# Patient Record
Sex: FEMALE | Race: WHITE | NOT HISPANIC OR LATINO | Employment: PART TIME | URBAN - METROPOLITAN AREA
[De-identification: names, ages, dates, MRNs, and addresses within clinical notes are randomized per-mention and may not be internally consistent; named-entity substitution may affect disease eponyms.]

---

## 2019-06-17 ENCOUNTER — TRANSCRIBE ORDERS (OUTPATIENT)
Dept: NEUROLOGY | Facility: CLINIC | Age: 39
End: 2019-06-17

## 2019-06-17 DIAGNOSIS — M54.2 CERVICALGIA: Primary | ICD-10-CM

## 2019-06-19 ENCOUNTER — TRANSCRIBE ORDERS (OUTPATIENT)
Dept: NEUROLOGY | Facility: CLINIC | Age: 39
End: 2019-06-19

## 2019-06-19 ENCOUNTER — PROCEDURE VISIT (OUTPATIENT)
Dept: NEUROLOGY | Facility: CLINIC | Age: 39
End: 2019-06-19
Payer: COMMERCIAL

## 2019-06-19 DIAGNOSIS — M54.2 CERVICALGIA: ICD-10-CM

## 2019-06-19 DIAGNOSIS — G56.03 BILATERAL CARPAL TUNNEL SYNDROME: ICD-10-CM

## 2019-06-19 DIAGNOSIS — M54.2 CERVICALGIA: Primary | ICD-10-CM

## 2019-06-19 PROCEDURE — 95886 MUSC TEST DONE W/N TEST COMP: CPT | Performed by: PHYSICAL MEDICINE & REHABILITATION

## 2019-06-19 PROCEDURE — 95911 NRV CNDJ TEST 9-10 STUDIES: CPT | Performed by: PHYSICAL MEDICINE & REHABILITATION

## 2021-02-10 ENCOUNTER — HOSPITAL ENCOUNTER (EMERGENCY)
Facility: HOSPITAL | Age: 41
Discharge: HOME | End: 2021-02-10
Attending: EMERGENCY MEDICINE | Admitting: EMERGENCY MEDICINE
Payer: COMMERCIAL

## 2021-02-10 VITALS
WEIGHT: 238.1 LBS | BODY MASS INDEX: 39.62 KG/M2 | HEART RATE: 102 BPM | RESPIRATION RATE: 16 BRPM | TEMPERATURE: 97.5 F | DIASTOLIC BLOOD PRESSURE: 86 MMHG | SYSTOLIC BLOOD PRESSURE: 189 MMHG | OXYGEN SATURATION: 99 %

## 2021-02-10 DIAGNOSIS — K08.89 PAIN, DENTAL: Primary | ICD-10-CM

## 2021-02-10 PROCEDURE — 99284 EMERGENCY DEPT VISIT MOD MDM: CPT | Performed by: EMERGENCY MEDICINE

## 2021-02-10 PROCEDURE — 99282 EMERGENCY DEPT VISIT SF MDM: CPT

## 2021-02-10 RX ORDER — AMOXICILLIN 250 MG/1
500 CAPSULE ORAL ONCE
Status: COMPLETED | OUTPATIENT
Start: 2021-02-10 | End: 2021-02-10

## 2021-02-10 RX ORDER — AMOXICILLIN 500 MG/1
500 CAPSULE ORAL 3 TIMES DAILY
Qty: 21 CAPSULE | Refills: 0 | Status: SHIPPED | OUTPATIENT
Start: 2021-02-10 | End: 2021-02-17

## 2021-02-10 RX ORDER — HYDROCODONE BITARTRATE AND ACETAMINOPHEN 5; 325 MG/1; MG/1
2 TABLET ORAL ONCE
Status: COMPLETED | OUTPATIENT
Start: 2021-02-10 | End: 2021-02-10

## 2021-02-10 RX ORDER — HYDROCODONE BITARTRATE AND ACETAMINOPHEN 5; 325 MG/1; MG/1
1 TABLET ORAL EVERY 6 HOURS PRN
Qty: 10 TABLET | Refills: 0 | Status: SHIPPED | OUTPATIENT
Start: 2021-02-10 | End: 2021-02-20

## 2021-02-10 RX ADMIN — AMOXICILLIN 500 MG: 250 CAPSULE ORAL at 10:30

## 2021-02-10 RX ADMIN — HYDROCODONE BITARTRATE AND ACETAMINOPHEN 2 TABLET: 5; 325 TABLET ORAL at 10:30

## 2021-02-10 NOTE — ED PROVIDER NOTES
History  Chief Complaint   Patient presents with    Dental Pain     Dental pain, worst in right lower jaw 2/2 to dental caries and possible fracture  Gradually worsening for days/weeks and now having trouble tolerating the pain  Feels like pain is radiating into jaw but no facial or neck swelling  No fevers  No trismus  History provided by:  Patient   used: No    Dental Pain  Location:  Lower  Lower teeth location:  31/RL 2nd molar and 30/RL 1st molar  Quality:  Aching  Severity:  Moderate  Onset quality:  Gradual  Timing:  Constant  Progression:  Worsening  Chronicity:  New  Context: dental fracture and poor dentition    Relieved by:  Nothing  Worsened by:  Touching, pressure, cold food/drink and hot food/drink  Ineffective treatments:  None tried  Associated symptoms: no drooling, no facial swelling, no fever, no gum swelling, no headaches, no neck pain, no neck swelling and no trismus        None       Past Medical History:   Diagnosis Date    Disease of thyroid gland        History reviewed  No pertinent surgical history  History reviewed  No pertinent family history  I have reviewed and agree with the history as documented  E-Cigarette/Vaping     E-Cigarette/Vaping Substances     Social History     Tobacco Use    Smoking status: Never Smoker    Smokeless tobacco: Never Used   Substance Use Topics    Alcohol use: Not Currently    Drug use: Never       Review of Systems   Constitutional: Negative for chills and fever  HENT: Positive for dental problem  Negative for drooling, ear pain, facial swelling, sore throat, trouble swallowing and voice change  Eyes: Negative for pain and visual disturbance  Respiratory: Negative for cough and shortness of breath  Cardiovascular: Negative for chest pain and palpitations  Gastrointestinal: Negative for abdominal pain and vomiting  Genitourinary: Negative for dysuria and hematuria     Musculoskeletal: Negative for arthralgias, back pain and neck pain  Skin: Negative for color change and rash  Neurological: Negative for seizures, syncope and headaches  All other systems reviewed and are negative  Physical Exam  Physical Exam  Vitals signs and nursing note reviewed  Constitutional:       General: She is not in acute distress  Appearance: She is well-developed  HENT:      Head: Normocephalic and atraumatic  Mouth/Throat:      Mouth: Mucous membranes are moist       Pharynx: No oropharyngeal exudate  Comments: There are multiple dental caries, worst in R lower jaw where 2nd molar is either fractured or badly decayed  There is no drainage or fluctuance to suggest drainable abscess  No trismus  No sign of PTA, RPA, ANUG or ludwigs  Eyes:      Conjunctiva/sclera: Conjunctivae normal    Neck:      Musculoskeletal: Neck supple  Cardiovascular:      Rate and Rhythm: Normal rate and regular rhythm  Heart sounds: No murmur  Pulmonary:      Effort: Pulmonary effort is normal  No respiratory distress  Breath sounds: Normal breath sounds  Abdominal:      Palpations: Abdomen is soft  Tenderness: There is no abdominal tenderness  Musculoskeletal: Normal range of motion  General: No swelling or tenderness  Skin:     General: Skin is warm and dry  Capillary Refill: Capillary refill takes less than 2 seconds  Neurological:      General: No focal deficit present  Mental Status: She is alert and oriented to person, place, and time           Vital Signs  ED Triage Vitals   Temperature Pulse Respirations Blood Pressure SpO2   02/10/21 1017 02/10/21 1017 02/10/21 1017 02/10/21 1017 02/10/21 1017   97 5 °F (36 4 °C) 102 16 (!) 189/86 99 %      Temp src Heart Rate Source Patient Position - Orthostatic VS BP Location FiO2 (%)   -- -- -- -- --             Pain Score       02/10/21 1030       9           Vitals:    02/10/21 1017   BP: (!) 189/86   Pulse: 102         Visual Acuity      ED Medications  Medications   HYDROcodone-acetaminophen (NORCO) 5-325 mg per tablet 2 tablet (2 tablets Oral Given 2/10/21 1030)   amoxicillin (AMOXIL) capsule 500 mg (500 mg Oral Given 2/10/21 1030)       Diagnostic Studies  Results Reviewed     None                 No orders to display              Procedures  Procedures         ED Course                                           MDM  Number of Diagnoses or Management Options  Pain, dental:   Diagnosis management comments: Dental pain without high risk features  Will start abx and pain meds, recommend prompt f/u with dentistry  Dental referral and return precautions provided  Disposition  Final diagnoses:   Pain, dental     Time reflects when diagnosis was documented in both MDM as applicable and the Disposition within this note     Time User Action Codes Description Comment    2/10/2021 10:35 AM Sirena Faith Add [K08 89] Pain, dental       ED Disposition     ED Disposition Condition Date/Time Comment    Discharge Stable Wed Feb 10, 2021 10:26 AM Delroy Wilson discharge to home/self care  Follow-up Information     Follow up With Specialties Details Why Contact Info    [facility]    [address] [phone]          Discharge Medication List as of 2/10/2021 10:38 AM      START taking these medications    Details   amoxicillin (AMOXIL) 500 mg capsule Take 1 capsule (500 mg total) by mouth 3 (three) times a day for 21 doses, Starting Wed 2/10/2021, Until Wed 2/17/2021, Print      HYDROcodone-acetaminophen (NORCO) 5-325 mg per tablet Take 1 tablet by mouth every 6 (six) hours as needed for pain (dentalgia) for up to 10 daysMax Daily Amount: 4 tablets, Starting Wed 2/10/2021, Until Sat 2/20/2021, Print           No discharge procedures on file      PDMP Review       Value Time User    PDMP Reviewed  Yes 2/10/2021 10:35 AM Clarence Boggs MD          ED Provider  Electronically Signed by           Miguel Soto MD  02/26/21 9336

## 2022-02-26 ENCOUNTER — APPOINTMENT (EMERGENCY)
Dept: RADIOLOGY | Facility: HOSPITAL | Age: 42
End: 2022-02-26
Payer: COMMERCIAL

## 2022-02-26 ENCOUNTER — APPOINTMENT (EMERGENCY)
Dept: CT IMAGING | Facility: HOSPITAL | Age: 42
End: 2022-02-26
Payer: COMMERCIAL

## 2022-02-26 ENCOUNTER — HOSPITAL ENCOUNTER (EMERGENCY)
Facility: HOSPITAL | Age: 42
Discharge: HOME | End: 2022-02-26
Attending: EMERGENCY MEDICINE
Payer: COMMERCIAL

## 2022-02-26 VITALS
DIASTOLIC BLOOD PRESSURE: 86 MMHG | SYSTOLIC BLOOD PRESSURE: 163 MMHG | WEIGHT: 236.33 LBS | BODY MASS INDEX: 39.38 KG/M2 | HEIGHT: 65 IN | HEART RATE: 97 BPM | OXYGEN SATURATION: 100 % | TEMPERATURE: 97.8 F | RESPIRATION RATE: 18 BRPM

## 2022-02-26 DIAGNOSIS — S16.1XXA CERVICAL STRAIN, ACUTE, INITIAL ENCOUNTER: ICD-10-CM

## 2022-02-26 DIAGNOSIS — S46.811A TRAPEZIUS MUSCLE STRAIN, RIGHT, INITIAL ENCOUNTER: ICD-10-CM
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
## 2022-02-26 LAB
EXT PREG TEST URINE: NEGATIVE
EXT. CONTROL ED NAV: NORMAL

## 2022-02-26 PROCEDURE — 81025 URINE PREGNANCY TEST: CPT | Performed by: EMERGENCY MEDICINE

## 2022-02-26 PROCEDURE — 93005 ELECTROCARDIOGRAM TRACING: CPT

## 2022-02-26 PROCEDURE — 72125 CT NECK SPINE W/O DYE: CPT

## 2022-02-26 PROCEDURE — G1004 CDSM NDSC: HCPCS

## 2022-02-26 PROCEDURE — 99285 EMERGENCY DEPT VISIT HI MDM: CPT | Performed by: EMERGENCY MEDICINE

## 2022-02-26 PROCEDURE — 73030 X-RAY EXAM OF SHOULDER: CPT

## 2022-02-26 PROCEDURE — 99285 EMERGENCY DEPT VISIT HI MDM: CPT

## 2022-02-26 PROCEDURE — 71046 X-RAY EXAM CHEST 2 VIEWS: CPT

## 2022-02-26 PROCEDURE — 72080 X-RAY EXAM THORACOLMB 2/> VW: CPT

## 2022-02-26 RX ORDER — LIDOCAINE 50 MG/G
1 PATCH TOPICAL ONCE
Status: DISCONTINUED | OUTPATIENT
Start: 2022-02-26 | End: 2022-02-26 | Stop reason: HOSPADM

## 2022-02-26 RX ORDER — METHOCARBAMOL 500 MG/1
1000 TABLET, FILM COATED ORAL ONCE
Status: COMPLETED | OUTPATIENT
Start: 2022-02-26 | End: 2022-02-26

## 2022-02-26 RX ORDER — IBUPROFEN 600 MG/1
600 TABLET ORAL ONCE
Status: COMPLETED | OUTPATIENT
Start: 2022-02-26 | End: 2022-02-26

## 2022-02-26 RX ORDER — NAPROXEN 500 MG/1
500 TABLET ORAL EVERY 12 HOURS PRN
Qty: 20 TABLET | Refills: 0 | Status: SHIPPED | OUTPATIENT
Start: 2022-02-26

## 2022-02-26 RX ORDER — ACETAMINOPHEN 325 MG/1
650 TABLET ORAL ONCE
Status: COMPLETED | OUTPATIENT
Start: 2022-02-26 | End: 2022-02-26

## 2022-02-26 RX ORDER — METHOCARBAMOL 500 MG/1
500 TABLET, FILM COATED ORAL EVERY 8 HOURS PRN
Qty: 15 TABLET | Refills: 0 | Status: SHIPPED | OUTPATIENT
Start: 2022-02-26

## 2022-02-26 RX ADMIN — LIDOCAINE 5% 1 PATCH: 700 PATCH TOPICAL at 17:46

## 2022-02-26 RX ADMIN — METHOCARBAMOL 1000 MG: 500 TABLET ORAL at 17:46

## 2022-02-26 RX ADMIN — ACETAMINOPHEN 650 MG: 325 TABLET, FILM COATED ORAL at 17:46

## 2022-02-26 RX ADMIN — IBUPROFEN 600 MG: 600 TABLET ORAL at 17:46
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
## 2022-02-27 LAB
ATRIAL RATE: 99 BPM
P AXIS: 52 DEGREES
PR INTERVAL: 136 MS
QRS AXIS: 28 DEGREES
QRSD INTERVAL: 82 MS
QT INTERVAL: 350 MS
QTC INTERVAL: 449 MS
T WAVE AXIS: 14 DEGREES
VENTRICULAR RATE: 99 BPM

## 2022-02-27 PROCEDURE — 93010 ELECTROCARDIOGRAM REPORT: CPT | Performed by: INTERNAL MEDICINE

## 2022-02-27 NOTE — DISCHARGE INSTRUCTIONS
ÅÌåÇÏ ÇáÝÞÑÇÊ ÇáÚäÞíÉ   JRMYCKGAC TCJNPUO ãÚÑÝÊåÇ:   Åä ÅÌåÇÏ ÇáÝÞÑÇÊ ÇáÚäÞíÉ ÚÈÇÑÉ Úä ÔÏ Ãæ ÊãÒÞ Ýí LNLZEHX Ãæ ÇáÃæÊÇÑ ÇáãæÌæÏÉ ÈÑÞÈÊß  QTYEYEJP åí ÚÈÇÑÉ Úä ÃäÓÌÉ ÞæíÉ ÊÑÈØ AAIMSUB ÈÇáÚÙÇã  ÅÑÔÇÏÇÊ ÇáÚäÇíÉ ÈÚÏ ÇáÎÑæÌ ãä ÇáãÓÊÔÝì:   ÇÍÕá Úáì ÑÚÇíÉ ÝæÑðÇ ÅÐÇ:  · ÇáÔÚæÑ ÈÃáã Ãæ ÎÏÑ Ýí ÇáÌÒÁ ãä ÃÓÝá ÇáßÊÝ ÍÊì íÏß  · ÇáãÚÇäÇÉ ãä ãÔßáÇÊ Ýí ÇáÑÄíÉ Ãæ ÇáÓãÚ Ãæ ÇáÊæÇÒä  · ÇáÔÚæÑ TDNXJPMDG Ãæ ÚÏã ÇáÊÑßíÒ  · CQQPXMYW ãä ãÔßáÇÊ Ýí GDUUWJ æÞæÉ ÇáÌÓã  ÇÊÕá ÈÇáØÈíÈ ÇáãÊÇÈÚ áÍÇáÊß ÅÐÇ ÍÏË ãÇ íáí:  · ÇáãÚÇäÇÉ ãä ÒíÇÏÉ ÇáÊæÑã Ãæ ÇáÔÚæÑ ÈÇáÃáã Ýí ÇáÑÞÈÉ  · ßÇä áÏíß GOCUXCLMS Ãæ ãÎÇæÝ ãÊÚáÞÉ ÈÍÇáÊß Ãæ ÈÇáÑÚÇíÉ ÇáãÞÏãÉ  ÇáÃÏæíÉ: ÞÏ ÊÍÊÇÌ áÃí ããÇ íáí:  · ÇáÃÓíÊÇãíäæÝíä íÎÝÝ ãä ÇáÃáã NBPVQE  ÓæÝ ÊÌÏå ULZXHS Ïæä ÇáÍÇÌÉ Åáì æÕÝÉ ØÈíÉ  ÇÓÊÝÓÑ Úä ßãíÉ ÇáÏæÇÁ ÇáæÇÌÈ YKZRKIZ æÚÏÏ ãÑÇÊ FQEMPGH  ÇÊÈÚ TDERSYRTD  ÇÞÑÃ KZAMDGEO FHJFFPSI ÈßÇÝÉ ÇáÃÏæíÉ ÇáÃÎÑì ÇáÊí BKZQZPUD ááæÞæÝ Úáì ãÇ ÅÐÇ ßÇäÊ ÊÍÊæí ÃíÖðÇ Úáì ÇáÃÓíÊÇãíäæÝíä Ãæ ÇÓÊÝÓÑ ãä ÇáØÈíÈ Ãæ ÇáÕíÏáí  íãßä Ãä íÊÓÈÈ ÇáÃÓíÊÇãíäæÝíä Ýí ÊáÝ ÇáßÈÏ ÅÐÇ áã íÊã ÊäÇæáå ÈØÑíÞÉ ÕÍíÍÉ  áÇ ÊÓÊÎÏã ÃßËÑ ãä 4 ÌÑÇãÇÊ (4,000 ãááí ÌÑÇã) ÈÔßá ÅÌãÇáí ãä ÇáÃÓíÊÇãíäæÝíä Ýí Çáíæã ÇáæÇÍÏ  · ÇáÃÏæíÉ ÇááÇÓÊíÑæíÏíÉ ÇáãÖÇÏÉ ááÇáÊåÇÈ (NSAIDs) ãËá ÅíÈæÈÑæÝíä¡ Úáì ÊÎÝíÝ ÇáÊæÑã XSDUWC æÇáÍãì  íãßä ÇáÍÕæá Úáì åÐÇ ÇáÏæÇÁ ÈäÇÁð Úáì æÕÝÉ ØÈíÉ Ãæ ÏæäåÇ  æíãßä Ãä ÊÊÓÈÈ ÇáÃÏæíÉ ÇááÇÓÊíÑæíÏíÉ ÇáãÖÇÏÉ ááÇáÊåÇÈ (NSAIDs) Ýí ÍÏæË äÒíÝ ÈÇáãÚÏÉ Ãæ ãÔßáÇÊ ÈÇáßáì ÚäÏ ÈÚÖ ÇáÃÔÎÇÕ  ÅÐÇ ßäÊ UPEBVL ÃÏæíÉ áãäÚ ÊÎËÑ ÇáÏã¡ ÝÇÍÑÕ ÏÇÆãðÇ Úáì VRWXROPYX ãä ãÞÏã ÇáÑÚÇíÉ ÇáÕÍíÉ WACHAWO Úä ãÊÇÈÚÊß ÚãÇ ÅÐÇ ßÇäÊ ÇáÃÏæíÉ ÇááÇÓÊíÑæíÏíÉ ÇáãÖÇÏÉ ááÇáÊåÇÈ (NSAID) ÂãäÉ áß Ãã áÇ  ÇÞÑÃ ÏÇÆãðÇ ÇáãáÕÞ ÇáØÈí æÇÊÈÚ ÇáÊÚáíãÇÊ  · ãÑÎíÇÊ RDBZXFT ÊÓÇÚÏ Úáì ÊÞáíá ÇáÃáã æÊÔäÌÇÊ ÇáÚÖáÇÊ  · ËãÉ ÏæÇÁ íÕÝå ÇáØÈíÈ áÊÓßíä ÇáÃáã ÞÏ íÊã ÅÚØÇÄå  ÇÓÊÝÓÑ ãä ãÞÏã ÇáÑÚÇíÉ ÇáÕÍíÉ Úä ßíÝíÉ ÊäÇæá åÐÇ ÇáÏæÇÁ ÈÃãÇä  ÊÍÊæí ÈÚÖ ÇáÃÏæíÉ ÇáãæÕæÝÉ áÊÓßíä ÇáÃáã Úáì ÇáÃÓíÊÇãíäæÝíä   áÇ ÊÞã ÈÊäÇæá ÃÏæíÉ ÃÎÑì ÊÍÊæí Úáì ÇáÃÓíÊÇãíäæÝíä ãä Ïæä ÇáÊÍÏË ãÚ ãÞÏã ÇáÑÚÇíÉ ÇáÕÍíÉ áÏíß  ÊäÇæá ÇáßËíÑ ãä ÇáÃÓíÊÇãíäæÝíä íãßä Ãä íÊÓÈÈ Ýí ÍÏæË ÊáÝ ÈÇáßÈÏ  ÞÏ íÊÓÈÈ ÇáÏæÇÁ ÇáãæÕæÝ áÊÓßíä ÇáÃáã Ýí ÍÏæË ÅãÓÇß  ÇÓÊÝÓÑ ãä ãÞÏã ÇáÑÚÇíÉ ÇáÕÍíÉ áÏíß Úä ßíÝíÉ ÊÌäÈ ÍÏæË ÇáÅãÓÇß Ãæ ÚáÇÌå  · ÊäÇæá ÇáÏæÇÁ æÝÞÇ XVHKNVYZL  ÇÊÕá ÈãÞÏã ÇáÑÚÇíÉ ÇáÕÍíÉ áÏíß ÅÐÇ ßäÊ ÊÚÊÞÏ Ãä ÇáÏæÇÁ ÇáÐí PMWXWKS áÇ íÝíÏß Ãæ ÅÐÇ ßäÊ ÊÚÇäí ãä ÂËÇÑ ÌÇäÈíÉ  ÃÎÈÑå Ãæ ÃÎÈÑåÇ ÅÐÇ ßÇäÊ áÏíß ÍÓÇÓíÉ ãä Ãí ÏæÇÁ  ÇÍÊÝÙ ÈÞÇÆãÉ ÇáÃÏæíÉ æÇáÝíÊÇãíäÇÊ GQFXEXEA ÇáÊí ÊÊäÇæáåÇ  ÃÏÑöÌ ÝíåÇ ÌÑÚÇÊ ÇáÃÏæíÉ æãæÇÚíÏåÇ æÓÈÈ ÊäÇæáåÇ  ÃÍÖöÑ ãÚß åÐå ÇáÞÇÆãÉ Ãæ ÚÈæÇÊ ÇáÃÞÑÇÕ ÚäÏ ÒíÇÑÇÊ ÇáãÊÇÈÚÉ  Asher Hawk ÞÇÆãÉ ÇáÃÏæíÉ ãÚß ÊÍÓÈðÇ áÍÇáÇÊ ÇáØæÇÑÆ  AFKFVKL ãÚ ÇáÃÚÑÇÖ:  · íõÑÌì æÖÚ ÔíÁ ÓÇÎä Úáì ÑÞÈÊß áãÏÉ ÊÊÑÇæÍ Èíä 15 Åáì 20 ÏÞíÞÉ¡ Ãæ ãä 4 Åáì 6 ãÑÇÊ Ãæ æÝÞðÇ ááÅÑÔÇÏÇÊ KJQXTPVODS  ÝÅä ACOWXQX ÊÓÇÚÏ Úáì ÊÞáíá ÇáÃáã Daivd Mylar AZPFNTC æÊÔäÌÇÊ ÇáÚÖáÇÊ  · ÇÈÏÃ Ýí ããÇÑÓÉ ÊãÑíäÇÊ áØíÝÉ ááÑÞÈÉ ÈÃÓÑÚ æÞÊ ããßä ÍíäãÇ ÊÓÊØíÚ ÊÍÑíß ÑÞÈÊß Ïæä ÇáÔÚæÑ ÈÃáã  ÊÓÇÚÏ ÇáÊãÑíäÇÊ ÇáÑíÇÖíÉ Úáì ÇáÊÞáíá ãä ÍÏÉ ÇáÊíÈÓ æÊÒíÏ ãä ÞæÉ ÑÞÈÊß VIAVSEN Úáì ÊÍÑíßåÇ  ÇÓÊÝÓÑ ãä ãÞÏã ÇáÑÚÇíÉ ÇáÕÍíÉ Úä äæÚ ÇáÊãÑíäÇÊ ÇáÑíÇÖíÉ ÇáÊí íÌÈ Úáíß ããÇÑÓÊåÇ  · ÚÏ Åáì ÃäÔØÊß ÇáãÚÊÇÏÉ ÈÔßá ÊÏÑíÌí ÍÓÈ HZSQLSJNQ  ÊæÞÝ ÅÐÇ ÔÚÑÊ ÈÃáã  ÊÌäÈ ÇáÃäÔØÉ ÇáÊí ÞÏ ÊÍÏË ÇáãÒíÏ ãä ÇáÖÑÑ ÈÑÞÈÊß¡ ãËá ÑÝÚ PQEBDUQ Ãæ ÇáÊãÇÑíä ÇáÑíÇÖíÉ ÇáÚäíÝÉ  · áÇ ÊÓÊÎÏã ÇáæÓÇÏÉ ÃËäÇÁ Çáäæã ßí ÊÓÇÚÏ Ýí ÊÎÝíÝ ÇáÃáã  Þã ÈáÝ ãäÔÝÉ ÈÅÍßÇã æÖÚåÇ ÊÍÊ ÑÞÈÊß ÈÏáÇð ãä Ðáß  · ÇáÊÒã ÈÌáÓÇÊ ÇáÚáÇÌ Iline Apgar DFFGRACBT  íãßä Ãä íÚáãß ãÊÎÕÕ ÇáÚáÇÌ ÇáØÈíÚí ÊÏÑíÈÇÊ ááãÓÇÚÏÉ Úáì ÊÍÓíä ÇáÍÑßÉ æÊÞáíá ÇáÃáã  ãäÚ ÍÏæË ÅÕÇÈÉ ÃÎÑì ÈÇáÑÞÈÉ:  · ÇÊÈÚ ØÑíÞÉ ÂãäÉ ááÞíÇÏÉ  ÊÃßÏ ãä ÌãíÚ ÇáÑÇßÈíä ÈÓíÇÑÊß íÓÊÎÏãæä ÍÒÇã ÇáÃãÇä  Ýíãßä áÍÒÇã ÇáÃãÇä Ãä íäÞÐ ÍíÇÊß Ýí ÍÇáÉ ÊÚÑÖß áÍÇÏË  ÊÌäÈ WZOOWWK ÇáåÇÊÝ INZTRUZ ÃËäÇÁ ÇáÞíÇÏÉ  ÝÞÏ íÊÓÈÈ Ðáß Ýí ÊÔÊíÊ ÇäÊÈÇåß æíÚÑÖß áÍÇÏË  ÃæÞÝ ÇáÓíÇÑÉ Úáì ÌÇäÈ ÇáØÑíÞ ÅÐÇ ÇÍÊÌÊ áÅÌÑÇÁ ãßÇáãÉ Ãæ ÅÑÓÇá ÑÓÇáÉ äÕíÉ  · ÇÑÊÏö ÎæÐÉ¡ æÓÊÑÉ ÇáäÌÇÉ ãä ÇáÛÑÞ¡ æÇáãáÇÈÓ ÇáæÇÞíÉ   ÇÍÑÕ ÏÇÆãðÇ Úáì ÇÑÊÏÇÁ ÎæÐÉ ÚäÏãÇ ÊÑßÈ ÏÑÇÌÉ Ãæ ÏÑÇÌÉ äÇÑíÉ¡ Ãæ ããÇÑÓÉ YSIXHT¡ Ãæ ããÇÑÓÉ ÇáÃáÚÇÈ ÇáÑíÇÖíÉ ÇáÊí ÞÏ ÊÓÈÈ ÅÕÇÈÉ ÈÇáÑÃÓ  ÇÑÊÏö ÃÌåÒÉ æÇÞíÉ ÚäÏ ããÇÑÓÉ ÇáÃáÚÇÈ ÇáÑíÇÖíÉ  ÇÑÊÏö ÓÊÑÉ ÇáäÌÇÉ ãä ÇáÛÑÞ ÚäÏ ÑßæÈß ÇáãÑßÈ Ãæ ããÇÑÓÉ ÇáÃáÚÇÈ ÇáãÇÆíÉ  íÌÈ ãÊÇÈÚÉ ÇáÍÇáÉ ãÚ ÇáØÈíÈ KYIRCXJ áß æÝÞÇð ááÅÑÔÇÏÇÊ ÇáãÞÏãÉ: æÞÏ ÊõÍÇá Åáì ØÈíÈ ÚÙÇã Ãæ ãÑÇßÒ ÇáÚáÇÌ ÇáØÈíÚí  íõÝÖá ÊÏæíä ÇáÃÓÆáÉ ÍÊì áÇ íÊã äÓíÇäåÇ ÃËäÇÁ ÒíÇÑÉ ÇáØÈíÈ  © Copyright Transbiomed 2022 Information is for End User's use only and may not be sold, redistributed or otherwise used for commercial purposes  All illustrations and images included in CareNotes® are the copyrighted property of [employer] , Inc  or [address] [place] [address]  The above information is an  only  It is not intended as medical advice for individual conditions or treatments  Talk to your doctor, nurse or pharmacist before following any medical regimen to see if it is safe and effective for you  ÇáÔÏ ÇáÚÖáí   FEUKBOEVX NVDYBHL ãÚÑÝÊåÇ:   ÇáÔÏ ÇáÚÖáí åæ ÇáÊæÇÁ¡ Ãæ ÔÏ¡ Ãæ ÊãÒÞ BVPTUSC Ãæ ÇáÃæÊÇÑ  ÇáæÊÑ åæ äÓíÌ Þæí ãÑä íÑÈØ GZROUQ ÈÇáÚÙã  ÊÔãá ÚáÇãÇÊ ÇáÔÏ ÇáÚÖáí æÌæÏ ßÏãÇÊ æÊæÑã ÝæÞ ÇáãäØÞÉ¡ æÃáã ÚäÏ ÇáÍÑßÉ¡ æÝÞÏÇä ÇáÞæÉ  ÅÑÔÇÏÇÊ ÇáÚäÇíÉ ÈÚÏ ÇáÎÑæÌ ãä ÇáãÓÊÔÝì:   ÇÍÕá Úáì ÑÚÇíÉ ÝæÑðÇ ÅÐÇ:  · ÔÚÑÊ ÝÌÃÉ ÈÚÏã ÇáÅÍÓÇÓ FSKIULT ÇáãÕÇÈÉ Ãæ ÚÏã ÇáÞÏÑÉ Úáì ÊÍÑíßåÇ  ÇÊÕá ÈÇáØÈíÈ ÇáãÊÇÈÚ áÍÇáÊß ÅÐÇ ÍÏË ãÇ íáí:  · ÊÝÇÞã ÇáÃáã IJIFTAU ÇáÐí ÊÚÇäí ãäå Ãæ ZYTNBAVD  · ßÇä áÏíß WFHLRKYSO Ãæ ãÎÇæÝ ãÊÚáÞÉ ÈÍÇáÊß Ãæ ÈÇáÑÚÇíÉ ÇáãÞÏãÉ  ÇáÃÏæíÉ: ÞÏ ÊÍÊÇÌ áÃí ããÇ íáí:  · ÇáÃÏæíÉ ÇááÇÓÊíÑæíÏíÉ ÇáãÖÇÏÉ ááÇáÊåÇÈ (NSAIDs) ãËá ÅíÈæÈÑæÝíä¡ Úáì ÊÎÝíÝ ÇáÊæÑã æÇáÃáã æÇáÍãì  æíãßä Ãä ÊÊÓÈÈ ÇáÃÏæíÉ ÇááÇÓÊíÑæíÏíÉ ÇáãÖÇÏÉ ááÇáÊåÇÈ (NSAIDs) Ýí ÍÏæË äÒíÝ ÈÇáãÚÏÉ Ãæ ãÔßáÇÊ ÈÇáßáì ÚäÏ ÈÚÖ ÇáÃÔÎÇÕ  ÅÐÇ ßäÊ DECVCA ÃÏæíÉ áãäÚ ÊÎËÑ ÇáÏã¡ ÝÇÍÑÕ ÏÇÆãðÇ Úáì OSJVSNGBZ ãä ãÞÏã ÇáÑÚÇíÉ ÇáÕÍíÉ MMEWXAL Úä ãÊÇÈÚÊß ÚãÇ ÅÐÇ ßÇäÊ ÇáÃÏæíÉ ÇááÇÓÊíÑæíÏíÉ ÇáãÖÇÏÉ ááÇáÊåÇÈ (NSAID) ÂãäÉ áß Ãã áÇ  ÇÞÑÃ ÏÇÆãðÇ ÇáãáÕÞ ÇáØÈí æÇÊÈÚ ÇáÊÚáíãÇÊ      · ãÑÎíÇÊ PMMCUVK ÊÓÇÚÏ Úáì ÊÞáíá ÇáÃáã æÊÔäÌÇÊ SWJGPUP  · ÊäÇæá ÇáÏæÇÁ æÝÞÇ SVKMUPBLZ  ÇÊÕá ÈãÞÏã ÇáÑÚÇíÉ ÇáÕÍíÉ áÏíß ÅÐÇ ßäÊ ÊÚÊÞÏ Ãä ÇáÏæÇÁ ÇáÐí WLBRLYE áÇ íÝíÏß Ãæ ÅÐÇ ßäÊ ÊÚÇäí ãä ÂËÇÑ ÌÇäÈíÉ  ÃÎÈÑå Ãæ ÃÎÈÑåÇ ÅÐÇ ßÇäÊ áÏíß ÍÓÇÓíÉ ãä Ãí ÏæÇÁ  ÇÍÊÝÙ ÈÞÇÆãÉ ÇáÃÏæíÉ æÇáÝíÊÇãíäÇÊ BSBCHGJM ÇáÊí ÊÊäÇæáåÇ  ÃÏÑöÌ ÝíåÇ ÌÑÚÇÊ ÇáÃÏæíÉ æãæÇÚíÏåÇ æÓÈÈ ÊäÇæáåÇ  ÃÍÖöÑ ãÚß åÐå ÇáÞÇÆãÉ Ãæ ÚÈæÇÊ ÇáÃÞÑÇÕ ÚäÏ ÒíÇÑÇÊ ÇáãÊÇÈÚÉ  Deborah Divina ÞÇÆãÉ ÇáÃÏæíÉ ãÚß ÊÍÓÈðÇ áÍÇáÇÊ ÇáØæÇÑÆ  ãÓÇÚÏÉ ÇáÔÏ ÇáÚÖáí Úáì ÇáÔÝÇÁ:  · ãä 3 Åáì 7 íæãðÇ ÈÚÏ ÇáÅÕÇÈÉ: ÇÓÊÑÍ WCKYBQW ÇáËáÌ AMOESKSQZ æÇáÑÝÚ ááãÓÇÚÏÉ Úáì ÅíÞÇÝ ÇáßÏãÇÊ æÊÞáíá ÇáÃáã æÇáÊæÑã  ? ÊÓÇÚÏ KOFCU ÚÖáÇÊß Úáì ÔÝÇÁ ÇáÅÕÇÈÉ  ÚäÏãÇ íÎÝ ÇáÃáã¡ ÇÈÏÃ Ýí ããÇÑÓÉ ÈÚÖ ÇáÍÑßÇÊ ÇáØÈíÚíÉ ÇáÈØíÆÉ  ÈÇáäÓÈÉ ááÔÏ ÇáÚÖáí ÇáÎÝíÝ MZAYNKKO¡ íÌÈ Úáíß ÅÑÇÍÉ ÚÖáÇÊß áãÏÉ íæãíä  ÅÐÇ ßäÊ ÊÚÇäí ãä ÔÏ ÚÖáí ÔÏíÏ¡ ÝÅäå íÌÈ Ãä ÊÓÊÑíÍ áãÏÉ ãä 10 Åáì 14 íæãÇ  ÅÐÇ ßÇä ÇáÔÏ ÇáÚÖáí Ýí ÓÇÞíß Ãæ ÇáÌÒÁ ÇáÓÝáí ãä ÌÓãß¡ ÝÞÏ ÊÍÊÇÌ áÇÓÊÎÏÇã ÚßÇÒíä ááãÔí  ? ÖÚ ËáÌðÇ ÝæÞ ÇáãäØÞÉ ÇáãÕÇÈÉ  ÇÓÊÎÏã ßíÓ ËáÌ Ãæ ÖÚ ËáÌðÇ ãÌÑæÔðÇ Ýí ßíÓ ãä ÇáÈáÇÓÊíß  Þã ÈÊÛØíÉ ÇáßíÓ ÈãäÔÝÉ ÞÈá æÖÚå Úáì ÈÔÑÊß  ÖÚ ËáÌ áãÏÉ ÊÊÑÇæÍ Èíä 15 æ20 ÏÞíÞÉ ßá ÓÇÚÉ¡ Ãæ ÍÓÈ DTKKOQXHN  ? ÇÓÊÎÏã ÇáÖÇÛØ áÊÞáíá ÇáÊæÑã  íãßäß áÝ ÖãÇÏÉ ãÑäÉ Íæá ÇáãäØÞÉ áÎáÞ ÖÛØ  ãä ÇáãÝÊÑÖ Ãä Êßæä ÇáÖãÇÏÉ ãÍßãÉ ÈÏÑÌÉ ßÇÝíÉ áÊÔÚÑ ÈÇáÏÚã  ããäæÚ áÝåÇ ÈÅÍßÇã ÔÏíÏ  ? ÇÑÝÚ ÇáãäØÞÉ áÊßæä ÝæÞ ãÓÊæì ÞáÈß¡ Åä Ããßä  ÇÑÝÚ YWYRAB ÇáãÕÇÈÉ XMPH ãä ãÓÊæì ÞáÈß ÞÏÑ ÇáÅãßÇä  Úáì ÓÈíá BFASLD¡ ÅÐÇ ßäÊ ÊÚÇäí ãä ÔÏ Ýí IRRIZC ÃÓÝá ÓÇÞß¡ ÇÓÊáÞ æÖÚ ÞÏãß Úáì æÓÇÏÇÊ  Alexa Interlaken íÓÇÚÏ Ðáß Úáì ÊÎÝíÝ ÇáÃáã æÇáÊæÑã  · ãä 3 Åáì 21 íæãðÇ ÈÚÏ ÇáÅÕÇÈÉ: ÇÈÏÃ ÊãÑíä ÚÖáÊß ÇáãÕÇÈÉ ÈÈØÁ æÇäÊÙÇã  ÓíÓÇÚÏ Ðáß Úáì ÔÝÇÆåÇ  ÅÐÇ ÔÚÑÊ ÈÃáã¡ Þáá ãä ÔÏÉ ÇáÊÏÑíÈ ÇáÐí ÊãÇÑÓå  · ãä ÃÓÈæÚ Åáì ÓÊÉ ÃÓÇÈíÚ ÈÚÏ ÇáÅÕÇÈÉ: ãÏÏ PFMCKL ÇáãÕÇÈÉ  Þã ÈÔÏ ZYITVHZ áãÏÉ 30 ËÇäíÉ  Þã ÈÐáß 4 ãÑÇÊ ßá íæã  íãßäß ÊãÏíÏ ÇáÚÖáÉ ÍÊì ÊÔÚÑ ÈÔÏ ÎÝíÝ  ÊæÞÝ Úä ÇáÊãÏíÏ ÅÐÇ ÔÚÑÊ MUUJHB      · ãä ÃÓÈæÚíä Åáì 6 ÃÔåÑ ÈÚÏ ÇáÅÕÇÈÉ: åÏÝ åÐå KQQMBSQ åæ WXIBRSU ÇáäÔÇØ ÇáÐí ßäÊ ÊãÇÑÓå ÞÈá æÞæÚ ZINKDAJ¡ Ïæä ÅÕÇÈÉ KZCXDU ãÌÏÏðÇ  · ãä 3 ÃÓÇÈíÚ Åáì 6 ÃÔåÑ ÈÚÏ ÇáÅÕÇÈÉ: ÍÇÝÙ Úáì ÊãÏíÏ æÊÞæíÉ ÚÖáÇÊß áãäÚ ÅÕÇÈÊåÇ  Þã ÈÒíÇÏÉ æÞÊ æãÓÇÝÉ ÇáÊÏÑíÈ ÊÏÑíÌíÇ  ÞÏ ÊÓÊãÑ áÏíß ÚáÇãÇÊ æÃÚÑÇÖ ÇáÅÕÇÈÉ ÈÔÏ ÚÖáí ÈÚÏ ÇáÅÕÇÈÉ ÈÓÊÉ ÃÔåÑ¡ ÍÊì ÅÐÇ ÞãÊ ÈÚãá ÃÔíÇÁ ááãÓÇÚÏ Úáì ÇáÔÝÇÁ  Ýí åÐå CJCKOF¡ ÞÏ ÊÍÊÇÌ Åáì ÅÌÑÇÁ ÌÑÇÍÉ Ýí ÇáÚÖáÉ  ÇáæÞÇíÉ ãä ÇáÔÏ ÇáÚÖáí:  · ÇÑÊÏö ÏÇÆãðÇ ÇáÃÍÐíÉ ÇáãäÇÓÈÉ ÚäÏ ããÇÑÓÉ ÇáÃáÚÇÈ ÇáÑíÇÖíÉ  IPQJPJ ÏÇÆãÇ ÃÍÐíÉ ÇáÑßÖ ÇáÎÇÕÉ Èß ÈÃÎÑì ÌÏíÏÉ ÅÐÇ ßäÊ ããÇÑÓÇ áÑíÇÖÉ ÇáÑßÖ  ÇÓÊÎÏã ÍÔæÇÊ ÎÇÕÉ ULHUBBJ Ãæ ÏÚÇãÇÊ áÞæÓ ÇáÞÏã áÚáÇÌ ãÔÇßá ÇáÓÇÞ Ãæ ÇáÞÏã  ÇØáÈ ãä ãÞÏã ÇáÑÚÇíÉ ÇáÕÍíÉ ÇáãÒíÏ ãä ZEDAUYIMW Íæá ÏÚÇãÇÊ ÇáÍÐÇÁ  · ãÇÑÓ ÊÏÑíÈÇÊ VVGNNKG æÇáÊåÏÆÉ  ãÇÑÓ ÊÏÑíÈÇÊ ÇáÊãÏíÏ ÞÈá ããÇÑÓÉ ÇáÊÏÑíÈÇÊ Ãæ ããÇÑÓÉ ÇáÃäÔØÉ ÇáÑíÇÖíÉ  ÊÓÇÚÏ åÐå ÇáÊãÑíäÇÊ Úáì ÊÎÝíÝ æÊÞáíá ÅÌåÇÏ ÚÖáÇÊß  ÇåÏÃ æÊãÏÏ ÈÚÏ ÇáÊÏÑíÈ  ããäæÚ ÇáÊæÞÝ æÇáÇÓÊÑÇÍÉ ÈÚÏ ÇáÊãÑíä ãä Ïæä ããÇÑÓÉ ÊÏÑíÈÇÊ ÇáÊåÏÆÉ ÃæáÇð  · ÍÇÝÙ Úáì ÞæÉ ÚÖáÇÊß ãä ÎáÇá ããÇÑÓÉ ÊÏÑíÈÇÊ ÇáÊÞæíÉ  ÊÓÇÚÏ åÐå ÇáÊÏÑíÈÇÊ ãËá ÑÝÚ YPTXLZU¡ [id number] TLYESU SRY NXIJGZ LFY ãÑæäÉ ÚÖáÇÊß æÞæÊåÇ  íãßä ááãÚÇáÌ ÇáÈÏäí Ãæ ÇáãÏÑÈ ãÓÇÚÏÊß Ýí Êáß ÇáÊÏÑíÈÇÊ  · ÇÈÏÃ ÈÈØÁ Ýí ÈÑäÇãÌ ÇáÊÏÑíÈÇÊ ÇáÑíÇÖíÉ Ãæ ÇáÊãÇÑíä  ÇÊÈÚ ÅÑÔÇÏÇÊ ãÞÏã ÇáÑÚÇíÉ ÇáÕÍíÉ BDUHWCJ áÍÇáÊß ÈÎÕæÕ æÞÊ ÇáÈÏÁ Ýí ããÇÑÓÉ ÇáÊãÇÑíä  Þã ÈÒíÇÏÉ æÞÊ¡ æãÓÇÝÉ¡ æãÑÇÊ ÇáÊÏÑíÈÇÊ ÇáÊí ÊãÇÑÓåÇ ÊÏÑíÌíÇ  ÝÞÏ ÊÄÏí ÒíÇÏÉ ãÑÇÊ ÇáÊÏÑíÈ ÈÔßá ãÝÇÌÆ Åáì ÅÕÇÈÉ ÚÖáÊß ãÌÏÏðÇ  íÌÈ ãÊÇÈÚÉ ÇáÍÇáÉ ãÚ ÇáØÈíÈ ONHYDWX áß æÝÞÇð TGGZCDIZM ÇáãÞÏãÉ: ÞÏ íÞÊÑÍ Úáíß ÇáØÈíÈ ZICXTCZ áÍÇáÊß ÇáÞíÇã ÈÒíÇÑÉ ãÊÇÈÚÉ ÞÈá ÇÓÊÆäÇÝß áÃäÔØÊß ÇáãÚÊÇÏÉ  íõÝÖá ÊÏæíä ÇáÃÓÆáÉ ÍÊì áÇ íÊã äÓíÇäåÇ ÃËäÇÁ ÒíÇÑÉ ÇáØÈíÈ  © Copyright [employer] [facility] 2022 Information is for End User's use only and may not be sold, redistributed or otherwise used for commercial purposes  All illustrations and images included in CareNotes® are the copyrighted property of [employer]  or [facility] Yani King  The above information is an  only  It is not intended as medical advice for individual conditions or treatments   Talk to your doctor, nurse or pharmacist before following any medical regimen to see if it is safe and effective for you  Motor Vehicle Accident   WHAT YOU NEED TO KNOW:   A motor vehicle accident (MVA) can cause injury from the impact or from being thrown around inside the car  You may have a bruise on your abdomen, chest, or neck from the seatbelt  You may also have pain in your face, neck, or back  You may have pain in your knee, hip, or thigh if your body hits the dash or the steering wheel  Muscle pain is commonly worse 1 to 2 days after an MVA  DISCHARGE INSTRUCTIONS:   Call your local emergency number (911 in the [facility]) if:   · You have new or worsening chest pain or shortness of breath  Call your doctor if:   · You have new or worsening pain in your abdomen  · You have nausea and vomiting that does not get better  · You have a severe headache  · You have weakness, tingling, or numbness in your arms or legs  · You have new or worsening pain that makes it hard for you to move  · You have pain that develops 2 to 3 days after the MVA  · You have questions or concerns about your condition or care  Medicines:   · Pain medicine: You may be given medicine to take away or decrease pain  Do not wait until the pain is severe before you take your medicine  · NSAIDs , such as ibuprofen, help decrease swelling, pain, and fever  This medicine is available with or without a doctor's order  NSAIDs can cause stomach bleeding or kidney problems in certain people  If you take blood thinner medicine, always ask if NSAIDs are safe for you  Always read the medicine label and follow directions  Do not give these medicines to children under 10months of age without direction from your child's healthcare provider  · Take your medicine as directed  Contact your healthcare provider if you think your medicine is not helping or if you have side effects  Tell him of her if you are allergic to any medicine  Keep a list of the medicines, vitamins, and herbs you take  Include the amounts, and when and why you take them  Bring the list or the pill bottles to follow-up visits  Carry your medicine list with you in case of an emergency  Self-care:   · Use ice and heat  Ice helps decrease swelling and pain  Ice may also help prevent tissue damage  Use an ice pack, or put crushed ice in a plastic bag  Cover it with a towel and apply to your injured area for 15 to 20 minutes every hour, or as directed  After 2 days, use a heating pad on your injured area  Use heat as directed  · Gently stretch  Use gentle exercises to stretch your muscles after an MVA  Ask your healthcare provider for exercises you can do  Safety tips: The following can help prevent another MVA or lower your risk for injury:  · Always wear your seatbelt  This will help reduce serious injury from an MVA  The seatbelt should have one strap that goes across your chest and another that goes across your lap  · Always put your child in a child safety seat  Use a safety seat made for his or her age, height, and weight  Choose a safety seat that has a harness and clip  Place the safety seat in the middle of the car's back seat  The safety seat should not move more than 1 inch in any direction after you secure it  Always follow the instructions provided for your safety seat to help you position it  The instructions will also guide you on how to secure your child properly  Ask your healthcare provider for more information about child safety seats  · Decrease speed  Drive the speed limit to reduce your risk for an MVA  · Do not drive if you are tired  You will react more slowly when you are tired  The slowed reaction time will increase your risk for an MVA  · Do not talk or text on your cell phone while you drive  You cannot respond fast enough in an emergency if you are distracted by texts or conversations      · Do not use drugs or drink alcohol before you drive  You may be more tired or take risks that you normally would not take  Do not drive after you take medicine that makes you sleepy  Use a designated  or arrange for a ride home  · Help your teenager become a safe   Be a good role model with your own driving  Talk to your teen about ways to lower the risk for an MVA  These include not driving when tired and not having distractions, such as a phone  Remind your teen to always go the speed limit and to wear a seatbelt  Follow up with your doctor as directed:  Write down your questions so you remember to ask them during your visits  © Copyright [employer] 2022 Information is for End User's use only and may not be sold, redistributed or otherwise used for commercial purposes  All illustrations and images included in CareNotes® are the copyrighted property of [employer]  or Anurag Mclaughlin   The above information is an  only  It is not intended as medical advice for individual conditions or treatments  Talk to your doctor, nurse or pharmacist before following any medical regimen to see if it is safe and effective for you

## 2023-08-28 ENCOUNTER — APPOINTMENT (EMERGENCY)
Dept: RADIOLOGY | Facility: HOSPITAL | Age: 43
End: 2023-08-28
Payer: COMMERCIAL

## 2023-08-28 ENCOUNTER — HOSPITAL ENCOUNTER (EMERGENCY)
Facility: HOSPITAL | Age: 43
Discharge: HOME | End: 2023-08-28
Attending: EMERGENCY MEDICINE
Payer: COMMERCIAL

## 2023-08-28 VITALS
DIASTOLIC BLOOD PRESSURE: 94 MMHG | TEMPERATURE: 98.9 F | HEART RATE: 96 BPM | SYSTOLIC BLOOD PRESSURE: 149 MMHG | RESPIRATION RATE: 20 BRPM | OXYGEN SATURATION: 100 %

## 2023-08-28 DIAGNOSIS — F41.9 ANXIETY: Primary | ICD-10-CM

## 2023-08-28 LAB
ALBUMIN SERPL BCP-MCNC: 5.1 G/DL (ref 3.5–5)
ALP SERPL-CCNC: 33 U/L (ref 34–104)
ALT SERPL W P-5'-P-CCNC: 14 U/L (ref 7–52)
ANION GAP SERPL CALCULATED.3IONS-SCNC: 9 MMOL/L
AST SERPL W P-5'-P-CCNC: 16 U/L (ref 13–39)
BASOPHILS # BLD AUTO: 0.13 THOUSANDS/ÂΜL (ref 0–0.1)
BASOPHILS NFR BLD AUTO: 1 % (ref 0–1)
BILIRUB SERPL-MCNC: 0.29 MG/DL (ref 0.2–1)
BUN SERPL-MCNC: 12 MG/DL (ref 5–25)
CALCIUM SERPL-MCNC: 10.1 MG/DL (ref 8.4–10.2)
CARDIAC TROPONIN I PNL SERPL HS: 3 NG/L
CHLORIDE SERPL-SCNC: 106 MMOL/L (ref 96–108)
CO2 SERPL-SCNC: 23 MMOL/L (ref 21–32)
CREAT SERPL-MCNC: 0.78 MG/DL (ref 0.6–1.3)
D DIMER PPP FEU-MCNC: 0.33 UG/ML FEU
EOSINOPHIL # BLD AUTO: 0.04 THOUSAND/ÂΜL (ref 0–0.61)
EOSINOPHIL NFR BLD AUTO: 0 % (ref 0–6)
ERYTHROCYTE [DISTWIDTH] IN BLOOD BY AUTOMATED COUNT: 17.5 % (ref 11.6–15.1)
GFR SERPL CREATININE-BSD FRML MDRD: 92 ML/MIN/1.73SQ M
GLUCOSE SERPL-MCNC: 77 MG/DL (ref 65–140)
HCT VFR BLD AUTO: 40.1 % (ref 34.8–46.1)
HGB BLD-MCNC: 12.7 G/DL (ref 11.5–15.4)
IMM GRANULOCYTES # BLD AUTO: 0.02 THOUSAND/UL (ref 0–0.2)
IMM GRANULOCYTES NFR BLD AUTO: 0 % (ref 0–2)
LYMPHOCYTES # BLD AUTO: 2.42 THOUSANDS/ÂΜL (ref 0.6–4.47)
LYMPHOCYTES NFR BLD AUTO: 25 % (ref 14–44)
MCH RBC QN AUTO: 24.8 PG (ref 26.8–34.3)
MCHC RBC AUTO-ENTMCNC: 31.7 G/DL (ref 31.4–37.4)
MCV RBC AUTO: 78 FL (ref 82–98)
MONOCYTES # BLD AUTO: 0.98 THOUSAND/ÂΜL (ref 0.17–1.22)
MONOCYTES NFR BLD AUTO: 10 % (ref 4–12)
NEUTROPHILS # BLD AUTO: 6.07 THOUSANDS/ÂΜL (ref 1.85–7.62)
NEUTS SEG NFR BLD AUTO: 64 % (ref 43–75)
NRBC BLD AUTO-RTO: 0 /100 WBCS
PLATELET # BLD AUTO: 292 THOUSANDS/UL (ref 149–390)
PMV BLD AUTO: 11.1 FL (ref 8.9–12.7)
POTASSIUM SERPL-SCNC: 3.5 MMOL/L (ref 3.5–5.3)
PROT SERPL-MCNC: 8.8 G/DL (ref 6.4–8.4)
RBC # BLD AUTO: 5.13 MILLION/UL (ref 3.81–5.12)
SODIUM SERPL-SCNC: 138 MMOL/L (ref 135–147)
TSH SERPL DL<=0.05 MIU/L-ACNC: 0.42 UIU/ML (ref 0.45–4.5)
WBC # BLD AUTO: 9.66 THOUSAND/UL (ref 4.31–10.16)

## 2023-08-28 PROCEDURE — 85025 COMPLETE CBC W/AUTO DIFF WBC: CPT | Performed by: EMERGENCY MEDICINE

## 2023-08-28 PROCEDURE — 80053 COMPREHEN METABOLIC PANEL: CPT | Performed by: EMERGENCY MEDICINE

## 2023-08-28 PROCEDURE — 36415 COLL VENOUS BLD VENIPUNCTURE: CPT | Performed by: EMERGENCY MEDICINE

## 2023-08-28 PROCEDURE — 99285 EMERGENCY DEPT VISIT HI MDM: CPT | Performed by: EMERGENCY MEDICINE

## 2023-08-28 PROCEDURE — 84439 ASSAY OF FREE THYROXINE: CPT | Performed by: EMERGENCY MEDICINE

## 2023-08-28 PROCEDURE — 93005 ELECTROCARDIOGRAM TRACING: CPT

## 2023-08-28 PROCEDURE — 71045 X-RAY EXAM CHEST 1 VIEW: CPT

## 2023-08-28 PROCEDURE — 96361 HYDRATE IV INFUSION ADD-ON: CPT

## 2023-08-28 PROCEDURE — 96374 THER/PROPH/DIAG INJ IV PUSH: CPT

## 2023-08-28 PROCEDURE — 84443 ASSAY THYROID STIM HORMONE: CPT | Performed by: EMERGENCY MEDICINE

## 2023-08-28 PROCEDURE — 85379 FIBRIN DEGRADATION QUANT: CPT | Performed by: EMERGENCY MEDICINE

## 2023-08-28 PROCEDURE — 99283 EMERGENCY DEPT VISIT LOW MDM: CPT

## 2023-08-28 PROCEDURE — 84484 ASSAY OF TROPONIN QUANT: CPT | Performed by: EMERGENCY MEDICINE

## 2023-08-28 RX ORDER — LORAZEPAM 2 MG/ML
1 INJECTION INTRAMUSCULAR ONCE
Status: COMPLETED | OUTPATIENT
Start: 2023-08-28 | End: 2023-08-28

## 2023-08-28 RX ORDER — HYDROXYZINE PAMOATE 25 MG/1
25 CAPSULE ORAL 3 TIMES DAILY PRN
Qty: 30 CAPSULE | Refills: 0 | Status: SHIPPED | OUTPATIENT
Start: 2023-08-28

## 2023-08-28 RX ADMIN — SODIUM CHLORIDE 1000 ML: 0.9 INJECTION, SOLUTION INTRAVENOUS at 16:50

## 2023-08-28 RX ADMIN — LORAZEPAM 1 MG: 2 INJECTION INTRAMUSCULAR; INTRAVENOUS at 16:47

## 2023-08-28 NOTE — ED PROVIDER NOTES
History  Chief Complaint   Patient presents with   • Panic Attack     Patient c/o panic attack that started 1 hour ago. Patient denies SI at this time. HPI  41-year-old female with past medical history of RA,  hyperlipidemia, anxiety, thyroid disease presents with panic attack. She states that one hour prior to arrival patient started to feel very anxious with SOB. She reports that she is having marital trouble with her . She states that they were  but now back together. She reports that he is contacting other women online. She denies HI/SI. She denies feeling unsafe at home but reports that she does not feel that her  treats her as she would like to be treated. She is currently taking effexor for her anxiety. Prior to Admission Medications   Prescriptions Last Dose Informant Patient Reported? Taking? methocarbamol (ROBAXIN) 500 mg tablet   No No   Sig: Take 1 tablet (500 mg total) by mouth every 8 (eight) hours as needed for muscle spasms   naproxen (NAPROSYN) 500 mg tablet   No No   Sig: Take 1 tablet (500 mg total) by mouth every 12 (twelve) hours as needed for mild pain or moderate pain . Take with food. Facility-Administered Medications: None       Past Medical History:   Diagnosis Date   • Disease of thyroid gland    • HLD (hyperlipidemia)    • RA (rheumatoid arthritis) ([address])        History reviewed. No pertinent surgical history. History reviewed. No pertinent family history. I have reviewed and agree with the history as documented.     E-Cigarette/Vaping   • E-Cigarette Use Never User      E-Cigarette/Vaping Substances     Social History     Tobacco Use   • Smoking status: Never   • Smokeless tobacco: Never   Vaping Use   • Vaping Use: Never used   Substance Use Topics   • Alcohol use: Not Currently   • Drug use: Never       Review of Systems    Physical Exam  Physical Exam    Vital Signs  ED Triage Vitals   Temperature Pulse Respirations Blood Pressure SpO2   08/28/23 [id number] 08/28/23 1618 08/28/23 1618 08/28/23 1618 08/28/23 1618   98.9 °F (37.2 °C) (!) 122 (!) 24 (!) 208/100 100 %      Temp src Heart Rate Source Patient Position - Orthostatic VS BP Location FiO2 (%)   -- 08/28/23 1723 08/28/23 1723 08/28/23 1723 --    Monitor Sitting Right arm       Pain Score       --                  Vitals:    08/28/23 1618 08/28/23 1708 08/28/23 1723 08/28/23 1800   BP: (!) 208/100  141/92 149/94   Pulse: (!) 122 102 101 96   Patient Position - Orthostatic VS:   Sitting Sitting         Visual Acuity      ED Medications  Medications   sodium chloride 0.9 % bolus 1,000 mL (0 mL Intravenous Stopped 8/28/23 1832)   LORazepam (ATIVAN) injection 1 mg (1 mg Intravenous Given 8/28/23 1647)       Diagnostic Studies  Results Reviewed     Procedure Component Value Units Date/Time    TSH, 3rd generation with Free T4 reflex [[id number]]  (Abnormal) Collected: 08/28/23 1655    Lab Status: Final result Specimen: Blood from Arm, Left Updated: 08/28/23 1733     TSH 3RD GENERATON 0.421 uIU/mL     T4, free [[id number]] Collected: 08/28/23 1655    Lab Status:  In process Specimen: Blood from Arm, Left Updated: 08/28/23 1733    HS Troponin 0hr (reflex protocol) [[id number]]  (Normal) Collected: 08/28/23 1650    Lab Status: Final result Specimen: Blood from Arm, Left Updated: 08/28/23 1725     hs TnI 0hr 3 ng/L     Comprehensive metabolic panel [[id number]]  (Abnormal) Collected: 08/28/23 1650    Lab Status: Final result Specimen: Blood from Arm, Left Updated: 08/28/23 1722     Sodium 138 mmol/L      Potassium 3.5 mmol/L      Chloride 106 mmol/L      CO2 23 mmol/L      ANION GAP 9 mmol/L      BUN 12 mg/dL      Creatinine 0.78 mg/dL      Glucose 77 mg/dL      Calcium 10.1 mg/dL      AST 16 U/L      ALT 14 U/L      Alkaline Phosphatase 33 U/L      Total Protein 8.8 g/dL      Albumin 5.1 g/dL      Total Bilirubin 0.29 mg/dL      eGFR 92 ml/min/1.73sq m     Narrative:      [place] guidelines for Chronic Kidney Disease (CKD):   •  Stage 1 with normal or high GFR (GFR > 90 mL/min/1.73 square meters)  •  Stage 2 Mild CKD (GFR = 60-89 mL/min/1.73 square meters)  •  Stage 3A Moderate CKD (GFR = 45-59 mL/min/1.73 square meters)  •  Stage 3B Moderate CKD (GFR = 30-44 mL/min/1.73 square meters)  •  Stage 4 Severe CKD (GFR = 15-29 mL/min/1.73 square meters)  •  Stage 5 End Stage CKD (GFR <15 mL/min/1.73 square meters)  Note: GFR calculation is accurate only with a steady state creatinine    D-dimer, quantitative [[id number]]  (Normal) Collected: 08/28/23 1650    Lab Status: Final result Specimen: Blood from Arm, Left Updated: 08/28/23 1715     D-Dimer, Quant 0.33 ug/ml FEU     CBC and differential [[id number]]  (Abnormal) Collected: 08/28/23 1650    Lab Status: Final result Specimen: Blood from Arm, Left Updated: 08/28/23 1659     WBC 9.66 Thousand/uL      RBC 5.13 Million/uL      Hemoglobin 12.7 g/dL      Hematocrit 40.1 %      MCV 78 fL      MCH 24.8 pg      MCHC 31.7 g/dL      RDW 17.5 %      MPV 11.1 fL      Platelets 777 Thousands/uL      nRBC 0 /100 WBCs      Neutrophils Relative 64 %      Immat GRANS % 0 %      Lymphocytes Relative 25 %      Monocytes Relative 10 %      Eosinophils Relative 0 %      Basophils Relative 1 %      Neutrophils Absolute 6.07 Thousands/µL      Immature Grans Absolute 0.02 Thousand/uL      Lymphocytes Absolute 2.42 Thousands/µL      Monocytes Absolute 0.98 Thousand/µL      Eosinophils Absolute 0.04 Thousand/µL      Basophils Absolute 0.13 Thousands/µL     Rapid drug screen, urine [[id number]]     Lab Status: No result Specimen: Urine     POCT alcohol breath test [[id number]]     Lab Status: No result                  XR chest 1 view portable    (Results Pending)              Procedures  ECG 12 Lead Documentation Only    Date/Time: 8/28/2023 4:47 PM    Performed by: Maddi Ireland MD  Authorized by: Maddi Ireland MD    Comments:      Sinus tachycardia rate of 118 normal axis and intervals no acute ST elevations or depressions             ED Course                               SBIRT 20yo+    Flowsheet Row Most Recent Value   Initial Alcohol Screen: US AUDIT-C     1. How often do you have a drink containing alcohol? 0 Filed at: 08/28/2023 1618   2. How many drinks containing alcohol do you have on a typical day you are drinking? 0 Filed at: 08/28/2023 1618   3a. Male UNDER 65: How often do you have five or more drinks on one occasion? 0 Filed at: 08/28/2023 1618   3b. FEMALE Any Age, or MALE 65+: How often do you have 4 or more drinks on one occassion? 0 Filed at: 08/28/2023 1618   Audit-C Score 0 Filed at: 08/28/2023 1618   JIM: How many times in the past year have you. .. Used an illegal drug or used a prescription medication for non-medical reasons? Never Filed at: 08/28/2023 1618                    MDM  Labs are unremarkable and reassuring, vitals normalized after medication in the ED. Crisis consulted, however patient declines, would like to go home, will prescribe history as needed for anxiety, return precautions given. Disposition  Final diagnoses:   Anxiety     Time reflects when diagnosis was documented in both MDM as applicable and the Disposition within this note     Time User Action Codes Description Comment    8/28/2023  5:53 PM Kylah Garcia Add [F41.9] Anxiety       ED Disposition     ED Disposition   Discharge    Condition   Stable    Date/Time   Mon Aug 28, 2023  6:25 PM    Comment   Jaredweston Katie discharge to home/self care.                Follow-up Information     Follow up With Specialties Details Why Contact Info    SARBJIT Gresham Nurse Practitioner, Family Medicine   [address] [place] [address]  [phone]            Patient's Medications   Discharge Prescriptions    HYDROXYZINE PAMOATE (VISTARIL) 25 MG CAPSULE    Take 1 capsule (25 mg total) by mouth 3 (three) times a day as needed for anxiety       Start Date: 8/28/2023 End Date: -- Order Dose: 25 mg       Quantity: 30 capsule    Refills: 0       No discharge procedures on file.     PDMP Review       Value Time User    PDMP Reviewed  Yes 2/10/2021 10:35 AM Luis A Alvarado MD          ED Provider  Electronically Signed by           Bernie Toribio MD  08/28/23 4900

## 2023-08-28 NOTE — DISCHARGE INSTRUCTIONS
Please follow up with resources as provided by crisis.  If you feel unsafe at home or like hurting yourself or anyone else, please call [phone]

## 2023-08-29 LAB
ATRIAL RATE: 118 BPM
P AXIS: 58 DEGREES
PR INTERVAL: 136 MS
QRS AXIS: 66 DEGREES
QRSD INTERVAL: 78 MS
QT INTERVAL: 332 MS
QTC INTERVAL: 465 MS
T WAVE AXIS: -2 DEGREES
T4 FREE SERPL-MCNC: 0.97 NG/DL (ref 0.61–1.12)
VENTRICULAR RATE: 118 BPM

## 2023-08-29 PROCEDURE — 93010 ELECTROCARDIOGRAM REPORT: CPT | Performed by: INTERNAL MEDICINE
